# Patient Record
Sex: MALE | Race: WHITE | Employment: UNEMPLOYED | URBAN - METROPOLITAN AREA
[De-identification: names, ages, dates, MRNs, and addresses within clinical notes are randomized per-mention and may not be internally consistent; named-entity substitution may affect disease eponyms.]

---

## 2022-12-30 ENCOUNTER — HOSPITAL ENCOUNTER (EMERGENCY)
Age: 37
Discharge: HOME | End: 2022-12-30
Attending: EMERGENCY MEDICINE
Payer: MEDICAID

## 2022-12-30 ENCOUNTER — APPOINTMENT (OUTPATIENT)
Dept: ULTRASOUND IMAGING | Age: 37
End: 2022-12-30
Payer: MEDICAID

## 2022-12-30 VITALS
HEIGHT: 73 IN | WEIGHT: 260 LBS | DIASTOLIC BLOOD PRESSURE: 95 MMHG | BODY MASS INDEX: 34.46 KG/M2 | RESPIRATION RATE: 16 BRPM | SYSTOLIC BLOOD PRESSURE: 148 MMHG | HEART RATE: 89 BPM | OXYGEN SATURATION: 96 % | TEMPERATURE: 98 F

## 2022-12-30 DIAGNOSIS — N45.2 ORCHITIS: Primary | ICD-10-CM

## 2022-12-30 DIAGNOSIS — N43.3 HYDROCELE, UNSPECIFIED HYDROCELE TYPE: ICD-10-CM

## 2022-12-30 LAB
APPEARANCE UR: CLEAR
BILIRUB UR QL: NEGATIVE
COLOR UR: YELLOW
GLUCOSE UR STRIP.AUTO-MCNC: NEGATIVE MG/DL
HGB UR QL STRIP: NEGATIVE
KETONES UR QL STRIP.AUTO: NEGATIVE MG/DL
LEUKOCYTE ESTERASE UR QL STRIP.AUTO: NEGATIVE
NITRITE UR QL STRIP.AUTO: NEGATIVE
PH UR STRIP: 5.5 [PH] (ref 5–8)
PROT UR STRIP-MCNC: NEGATIVE MG/DL
SP GR UR REFRACTOMETRY: 1.01 (ref 1–1.03)
UROBILINOGEN UR QL STRIP.AUTO: 0.2 EU/DL (ref 0.2–1)

## 2022-12-30 PROCEDURE — 87491 CHLMYD TRACH DNA AMP PROBE: CPT

## 2022-12-30 PROCEDURE — 99284 EMERGENCY DEPT VISIT MOD MDM: CPT

## 2022-12-30 PROCEDURE — 76870 US EXAM SCROTUM: CPT

## 2022-12-30 PROCEDURE — 81003 URINALYSIS AUTO W/O SCOPE: CPT

## 2022-12-30 PROCEDURE — 96372 THER/PROPH/DIAG INJ SC/IM: CPT

## 2022-12-30 PROCEDURE — 74011000250 HC RX REV CODE- 250

## 2022-12-30 PROCEDURE — 74011250636 HC RX REV CODE- 250/636

## 2022-12-30 RX ORDER — DOXYCYCLINE HYCLATE 100 MG
100 TABLET ORAL 2 TIMES DAILY
Qty: 14 TABLET | Refills: 0 | Status: SHIPPED | OUTPATIENT
Start: 2022-12-30 | End: 2023-01-06

## 2022-12-30 RX ORDER — ACETAMINOPHEN 500 MG
1000 TABLET ORAL ONCE
Status: DISCONTINUED | OUTPATIENT
Start: 2022-12-30 | End: 2022-12-30 | Stop reason: HOSPADM

## 2022-12-30 RX ADMIN — LIDOCAINE HYDROCHLORIDE 500 MG: 10 INJECTION, SOLUTION EPIDURAL; INFILTRATION; INTRACAUDAL; PERINEURAL at 16:23

## 2022-12-30 NOTE — ED TRIAGE NOTES
PT states groin pain started last night following what he discribes as a swelling and bursting sensation, with a feeling of fluid drippng down legs. Pt states pain has since deminished today. Describes pain between scrotum and buttocks. 9.5/10 pain, medicating w/600mg ibuprofen.

## 2022-12-30 NOTE — ED PROVIDER NOTES
39 y/o male with no significant PMHX presents to the ED for groin pain starting last night. He notes yesterday he felt a sensation of his scrotum swelling up like a balloon with associated pain. Then he felt a sensation of it bursting and water trickling down his leg. This morning he woke up and continues to have sharp intermittent pain. He denies any penial discharge. No dysuria or hematuria. No urinary frequency or urgency. No lesions or ulcers. No rash. He is not currently sexually active. He denies any new sexual partners. He has never been treated for STIs in the past. No hx of STIs. No past medical history on file. No past surgical history on file. No family history on file. Social History     Socioeconomic History    Marital status: SINGLE     Spouse name: Not on file    Number of children: Not on file    Years of education: Not on file    Highest education level: Not on file   Occupational History    Not on file   Tobacco Use    Smoking status: Not on file    Smokeless tobacco: Not on file   Substance and Sexual Activity    Alcohol use: Not on file    Drug use: Not on file    Sexual activity: Not on file   Other Topics Concern    Not on file   Social History Narrative    Not on file     Social Determinants of Health     Financial Resource Strain: Not on file   Food Insecurity: Not on file   Transportation Needs: Not on file   Physical Activity: Not on file   Stress: Not on file   Social Connections: Not on file   Intimate Partner Violence: Not on file   Housing Stability: Not on file         ALLERGIES: Patient has no allergy information on record. Review of Systems   All other systems reviewed and are negative. Vitals:    12/30/22 1415   BP: (!) 148/95   Pulse: 89   Resp: 16   Temp: 98 °F (36.7 °C)   SpO2: 96%   Weight: 117.9 kg (260 lb)   Height: 6' 1\" (1.854 m)            Physical Exam  Vitals and nursing note reviewed. Constitutional:       Appearance: Normal appearance. HENT:      Head: Normocephalic and atraumatic. Right Ear: External ear normal.      Left Ear: External ear normal.      Nose: Nose normal.      Mouth/Throat:      Mouth: Mucous membranes are moist.   Eyes:      Conjunctiva/sclera: Conjunctivae normal.   Cardiovascular:      Rate and Rhythm: Normal rate and regular rhythm. Pulses: Normal pulses. Heart sounds: Normal heart sounds. Pulmonary:      Effort: Pulmonary effort is normal. No respiratory distress. Breath sounds: Normal breath sounds. No wheezing, rhonchi or rales. Abdominal:      General: There is no distension. Palpations: Abdomen is soft. Tenderness: There is no abdominal tenderness. Genitourinary:     Comments: [employer] male chaperone present for  exam. Normal external genitalia. No lesions or ulcers notes. No rash. No penile discharge. Tenderness to palpation of the back of the right testicle. No hernia. Musculoskeletal:         General: No deformity. Normal range of motion. Cervical back: Normal range of motion and neck supple. Right lower leg: No edema. Left lower leg: No edema. Skin:     General: Skin is warm and dry. Capillary Refill: Capillary refill takes less than 2 seconds. Findings: No lesion or rash. Neurological:      Mental Status: He is alert and oriented to person, place, and time. Comments: Moving all extremities freely. Antalgic gait secondary to groin pain. Psychiatric:         Mood and Affect: Mood normal.         Behavior: Behavior normal.         Thought Content:  Thought content normal.         Judgment: Judgment normal.        MDM  Number of Diagnoses or Management Options  Orchitis  Diagnosis management comments: Recent Results (from the past 12 hour(s))  -URINALYSIS W/ RFLX MICROSCOPIC:   Collection Time: 12/30/22  3:40 PM       Result                      Value             Ref Range           Color                       YELLOW Appearance                  CLEAR                                 Specific gravity            1.011             1.005 - 1.03*       pH (UA)                     5.5               5.0 - 8.0           Protein                     Negative          NEG mg/dL           Glucose                     Negative          NEG mg/dL           Ketone                      Negative          NEG mg/dL           Bilirubin                   Negative          NEG                 Blood                       Negative          NEG                 Urobilinogen                0.2               0.2 - 1.0 EU*       Nitrites                    Negative          NEG                 Leukocyte Esterase          Negative          NEG              US SCROTUM/TESTICLES W DOPPLER   Final Result    Hyperemia involving the right testis and possibly the left testis, which may    reflect orchitis. No evidence of epididymitis. Preserved vascular flow within the bilateral testes which makes torsion unlikely    although intermittent torsion/detorsion is possible in the appropriate clinical    setting. Small to moderate left and trace right bilateral hydroceles with internal    debris. ED Course as of 12/30/22 1625   Fri Dec 30, 2022   1448 41 y/o male with no significant PMHX presents to the ED with testicular pain since last night. Vital signs patient is hypertensive, most likely appropriate response to patient's pain. Physical exam with ED [facility] male chaperone present reveals  normal external genitalia with no rash or lesions. Tenderness to palpation dorsal aspect of the right testicle. [KB]   9178 Urine shows no signs of UTI. GC/Chlamydia pending    US scrotum shows no evidence of testicular torsion. Possible orchitis, no evidence of epididymitis. Will empirically treat with antibiotics. Discussed most likely diagnosis with the patient and he agrees with plan of care.  He remains hemodynamically stable for further outpatient management. Return precautions given.   [KB]      ED Course User Index  [KB] Mariaelena Harley, DO       Procedures

## 2022-12-30 NOTE — DISCHARGE INSTRUCTIONS
You were evaluated in the ED today for testicular pain. Please take the antibiotic as prescribed. Return to the ED if you develop worsening pain, swelling, rash or any other worsening or concerning symptom.

## 2023-06-01 ENCOUNTER — HOSPITAL ENCOUNTER (EMERGENCY)
Facility: HOSPITAL | Age: 38
Discharge: HOME | End: 2023-06-01
Attending: EMERGENCY MEDICINE
Payer: MEDICAID

## 2023-06-01 VITALS
OXYGEN SATURATION: 95 % | TEMPERATURE: 99.3 F | SYSTOLIC BLOOD PRESSURE: 129 MMHG | HEART RATE: 89 BPM | DIASTOLIC BLOOD PRESSURE: 91 MMHG | RESPIRATION RATE: 18 BRPM

## 2023-06-01 DIAGNOSIS — Z76.0 ENCOUNTER FOR MEDICATION REFILL: Primary | ICD-10-CM

## 2023-06-01 PROCEDURE — 99283 EMERGENCY DEPT VISIT LOW MDM: CPT | Performed by: PHYSICIAN ASSISTANT

## 2023-06-01 RX ORDER — HYDROXYZINE 50 MG/1
50 TABLET, FILM COATED ORAL EVERY 8 HOURS PRN
Qty: 30 TABLET | Refills: 0 | Status: SHIPPED | OUTPATIENT
Start: 2023-06-01 | End: 2023-07-01

## 2023-06-01 RX ORDER — ARIPIPRAZOLE 20 MG/1
20 TABLET ORAL EVERY MORNING
Qty: 10 TABLET | Refills: 0 | Status: SHIPPED | OUTPATIENT
Start: 2023-06-01

## 2023-06-01 RX ORDER — ARIPIPRAZOLE 15 MG/1
15 TABLET ORAL NIGHTLY
Qty: 10 TABLET | Refills: 0 | Status: SHIPPED | OUTPATIENT
Start: 2023-06-01

## 2023-06-01 ASSESSMENT — ENCOUNTER SYMPTOMS
CONSTIPATION: 0
SHORTNESS OF BREATH: 0
EYE DISCHARGE: 0
DIARRHEA: 0
EYE REDNESS: 0
VOMITING: 0
WHEEZING: 0
SORE THROAT: 0
NAUSEA: 0
COUGH: 0
ABDOMINAL PAIN: 0
RHINORRHEA: 0
BACK PAIN: 0
STRIDOR: 0

## 2023-06-01 NOTE — ED TRIAGE NOTES
Patient requesting a medication refill of ability and hydroxyzine he is new to the area with no PCP at this time.

## 2023-06-02 NOTE — ED PROVIDER NOTES
EMERGENCY DEPARTMENT HISTORY AND PHYSICAL EXAM    Date: 6/1/2023  Patient Name: Viktoria Shaw    History of Presenting Illness     Chief Complaint   Patient presents with    Medication Refill         History Provided By: patient     Chief Complaint: med refill   Duration: 1 day  Timing:  acute on chronic  Location: n/a  Quality: n/a  Severity: n/a  Modifying Factors: none   Associated Symptoms: none      Additional History (Context): Viktoria Shaw is a 40 y.o. male with PMH schizophrenia and anxiety who presents to the ED requesting a refill of his abilify and hydroxyzine. Pt states he took the last doses of these meds today. He is working on establishing a pcp. Denies SI/HI. No other complaints reported. PCP: None None    No current facility-administered medications for this encounter. Current Outpatient Medications   Medication Sig Dispense Refill    ARIPiprazole (ABILIFY) 20 MG tablet Take 1 tablet by mouth every morning 10 tablet 0    ARIPiprazole (ABILIFY) 15 MG tablet Take 1 tablet by mouth at bedtime 10 tablet 0    hydrOXYzine HCl (ATARAX) 50 MG tablet Take 1 tablet by mouth every 8 hours as needed for Itching 30 tablet 0       Past History     Past Medical History:  No past medical history on file. Past Surgical History:  No past surgical history on file. Family History:  No family history on file. Social History: Allergies:  No Known Allergies      Review of Systems   Review of Systems   Constitutional:  Negative for chills and fever. HENT:  Negative for congestion, rhinorrhea and sore throat. Eyes:  Negative for discharge and redness. Respiratory:  Negative for cough, shortness of breath, wheezing and stridor. Cardiovascular:  Negative for chest pain. Gastrointestinal:  Negative for abdominal pain, constipation, diarrhea, nausea and vomiting. Genitourinary:  Negative for difficulty urinating, dysuria and frequency.    Musculoskeletal:  Negative for back pain and neck